# Patient Record
(demographics unavailable — no encounter records)

---

## 2020-04-11 NOTE — CON
[address]  [address]                    CONSULTATION                  
_______________________________________________________________________________
 
Name:       AMAIRANI BAIG                  Room:           [address] 
MXochitlRXochitl#:  [id number]      Account #:      [id number]  
Admission:  04/10/20     Attend Phys:    Floyd Kong MD 
Discharge:               Date of Birth:  01/19/43  
         Report #: [id number]
                                                                     [id number]  
_______________________________________________________________________________
THIS REPORT FOR:  //name//                      
 
cc:  Jeanne Winston Linda J. DO                                                
THIS REPORT FOR:  //name//                      
 
CC: Floyd Cormier
 
DATE OF SERVICE:  04/11/2020
 
 
CARDIOLOGY CONSULTATION
 
HISTORY OF PRESENT ILLNESS:  The patient is a 77-year-old  white male who
I was asked to see in the hospital after he complained of being short of breath.
 The patient has an extensive and complicated past medical history.  He has had
multiple stents in the past.  His first stent was placed in 2004 at [facility].  His last stent was placed in 2017 in [place], [place].  He has had a
total of 5 stents.  He has a history of an ischemic cardiomyopathy.  His first
defibrillator was implanted in 2018 at [facility].  He is
followed by Dr. Varela at that time.  Recently, he was admitted to [place] where
he spends the winter.  He underwent mitral valve clip for mitral regurgitation. 
He actually just saw my nurse practitioner, Lluvia Stevens 10 days ago.  He was
doing well until last night, he developed shortness of breath.  Denied any chest
pain, fever, cough, edema.  Denied noncompliance.  Denied any palpitations,
syncope.  He came to the hospital and was admitted.
 
PAST MEDICAL HISTORY:  He has had hernia repair, knee surgery, hypertension,
hyperlipidemia.
 
MEDICATIONS:  Consists of carvedilol, nebulized treatments, Plavix, Demadex as
needed, aspirin, Crestor, CPAP.
 
ALLERGIES:  He has no known drug allergies.
 
FAMILY HISTORY:  Negative for heart disease.
 
SOCIAL HISTORY:  He is .  He and his wife live in [place].  Retired
from [employer].  Smokes half pack of cigarettes.  No alcohol abuse.
 
REVIEW OF SYSTEMS:  He has had previous TIA.  No history of liver disease,
kidney disease, cancer, psychiatric illness, chronic skin condition, psychiatric
illness.
 
 
 
[place] [address]                    CONSULTATION                  
_______________________________________________________________________________
 
Name:       AMAIRANI BAIG                  Room:           [address] 
BREONNA#:  [id number]      Account #:      [id number]  
Admission:  04/10/20     Attend Phys:    Floyd Kong MD 
Discharge:               Date of Birth:  01/19/43  
         Report #: [id number]
                                                                     [id number]  
_______________________________________________________________________________
 
PHYSICAL EXAMINATION:
GENERAL:  Revealed an elderly male, lying in bed, appeared in no distress.
VITAL SIGNS:  Blood pressure is 110/60, pulse is 80.  He is afebrile.
HEENT:  He was anicteric.  Conjunctivae are pink.  Mucous membranes moist.
NECK:  Veins nondistended.  No carotid bruits.
CHEST:  Clear to auscultation.
CARDIOVASCULAR:  Regular rate and rhythm.  No significant murmurs.
ABDOMEN:  Soft.
EXTREMITIES:  Had no edema.  Posterior tibial pulse 2+ bilaterally.
SKIN:  Cool and dry.
NEUROLOGIC:  Nonfocal.
 
RADIOLOGICAL DATA:  His ECG showed P-wave sensing and ventricular capture,
occasional PVC.  His echocardiogram done in [place] in 02/2020 showed an ejection
fraction of 30%, severe mitral regurgitation, left atrial enlargement.
 
LABORATORY DATA:  His lab work last night showed cardiomegaly, mild pulmonary
congestion, defibrillator implant in place.  His lab work, sodium 142, BUN 16,
creatinine 1.6.  His liver function studies were normal.  BNP 1965.  His white
blood cell count 13.8, hemoglobin 10.8, hematocrit 35.6.
 
IMPRESSION AND RECOMMENDATIONS:
1.  Coronary artery disease.  No recent angina.  I would continue Plavix.
2.  Cardiomyopathy.  The patient is on a beta blocker.  I would recommend
starting Aldactone.  If tolerated, I would consider Entresto.  I would consider
spironolactone.
3.  Hyperlipidemia.  The patient is on a statin drug.
4.  Tobacco abuse.
5.  Chronic obstructive pulmonary disease.  The patient followed by Pulmonary.
6.  Previous implantation of defibrillator.
7.  Recent mitral valve clip for mitral regurgitation.
 
 
 
 
 
 
 
 
 
 
 
 
<ELECTRONICALLY SIGNED>
                                        By:  Tavo Tellez MD, FACC      
04/11/20     1105
D: 04/11/20 0921_______________________________________
T: 04/11/20 1009Dahaley Tellez MD, FACC         /nt

## 2020-04-11 NOTE — [place] [address]
Phone:  [phone]                     ELECTROCARDIOGRAM REPORT      
_______________________________________________________________________________
 
Name:         AMAIRANI BAIG                 Room:          [address]
M.R.#:    [id number]     Account #:     [id number]  
Admission:    04/10/20    Attend Phys:   Floyd Kong, 
Discharge:                Date of Birth: 43  
Date of Service: 04/10/20 2137  Report #:      [id number]
        [id number]
_______________________________________________________________________________
THIS REPORT FOR:  //name//                      
 
                         [facility] ED
                                       
Test Date:    2020-04-10               Test Time:    21:37:00
Pat Name:     AMAIRANI BAIG            Department:   
Patient ID:   [id number]            Room:         [facility]
Gender:       M                        Technician:   
:          1943               Requested By: Cristo Hartmann
Order Number: [id number] MD:   Tavo Tellez
                                 Measurements
Intervals                              Axis          
Rate:         114                      P:            25
WY:           48                       QRS:          10
QRSD:         129                      T:            167
QT:           321                                    
QTc:          443                                    
                           Interpretive Statements
sinus tachycardia with pvc's
LBBB
Baseline wander in lead(s) I,II,aVR,V1
Compared to ECG 2015 07:25:53
Sinus rhythm no longer present
pvc's noted
 
Electronically Signed On 2020 11:21:53 CDT by Tavo Tellez
[email or web address]
 
 
 
 
 
 
 
 
 
 
 
 
 
 
 
 
 
  <ELECTRONICALLY SIGNED>
                                           By: Tavo Tellez MD, FACC      
  20     1121
D: 04/10/20 2137   _____________________________________
T: 04/10/20 2137   Tavo Tellez MD, FAC        /EPI

## 2021-10-26 NOTE — EKG
[place] [address]
Phone:  [phone]                     ELECTROCARDIOGRAM REPORT      
_______________________________________________________________________________
 
Name:         AMAIRANI BAIG               Room:          [place] [address]    ADM [place] 
[facility].#:    [id number]     Account #:     [id number]  
Admission:    10/26/21    Attend Phys:   Barbara Root, 
Discharge:                Date of Birth: 43  
Date of Service: 10/25/21 2222  Report #:      [id number]
        [id number]
_______________________________________________________________________________
THIS REPORT FOR:  //name//                      
 
                         [facility] ED
                                       
Test Date:    2021-10-25               Test Time:    22:22:12
Pat Name:     AMAIRANI BAIG            Department:   
Patient ID:   [id number]            Room:         [facility]
Gender:       M                        Technician:   FL
:          1943               Requested By: Carina Espino
Order Number: [id number] MD:   Tavo Tellez
                                 Measurements
Intervals                              Axis          
Rate:         103                      P:            244
MI:           58                       QRS:          -34
QRSD:         119                      T:            116
QT:           326                                    
QTc:          427                                    
                           Interpretive Statements
atrial sensed and Ventricular-paced complexes with PVC's
No further analysis attempted due to paced rhythm
Compared to ECG 04/10/2020 21:37:00
no change
Electronically Signed On 10- 13:14:17 CDT by Tavo Tellez
[email or web address]
 
 
 
 
 
 
 
 
 
 
 
 
 
 
 
 
 
 
 
 
  <ELECTRONICALLY SIGNED>
                                           By: Tavo Tellez MD, FACC      
  10/26/21     1314
D: 10/25/21 2222   _____________________________________
T: 10/25/21 2222   Tavo Tellez MD, FACC        /EPI

## 2021-10-27 NOTE — CON
[address]  [address]                    CONSULTATION                  
_______________________________________________________________________________
 
Name:       AMAIRANI BIAG                Room:           [address] [place]  
M.R.#:  [id number]      Account #:      [id number]  
Admission:  10/26/21     Attend Phys:    Barbara Root MD 
Discharge:               Date of Birth:  01/19/43  
         Report #: [id number]
                                                                     [id number]
_______________________________________________________________________________
THIS REPORT FOR:  
 
cc:  Jeanne Winston Linda J. DO Pervez, Adeel MD                                                   ~
 
 
DATE OF CONSULTATION: 10/26/2021
 
REQUESTING PHYSICIAN:  Dr. Root.
 
INDICATION FOR CONSULTATION:  Lung mass and hilar lymphadenopathy.
 
HISTORY OF PRESENT ILLNESS:  This is a 78-year-old gentleman.  He has a history 
of congestive heart failure with a left ventricular ejection fraction decreased 
to around 35%.  He also has had mitral regurgitation in the past and is status 
post mitral clip.  He has a biventricular AICD in place.  He is an active smoker
and has been an active smoker for several decades; however, his previous lung 
function testing only show minimal abnormalities.  He is not on supplemental 
oxygen.  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  
This has been present at least since 2018 when he had a bronchoscopy performed 
in [facility]; the details from that time are not available to me at this time. 
He has been followed by the pulmonary group at [place] more recently. He is 
reported to have had a PET scan earlier this year and this has been followed 
with CAT scans since May with no major change.  I do not have the PET scan 
report available; however, according to the records from [place], the mass did 
not show any increase in activity on the PET scan.
 
The patient is now presented with shortness of breath of several days' duration.
 He has also had a cough.  There is not much sputum; in fact does not have much 
increase in swelling of lower extremities.  He does not have calf pain.  He does
not have upper respiratory complaints either.  He initially was hypoxemic.  Note
that he has not been on oxygen before and required 2-3 liters of oxygen to 
maintain O2 saturation in the low 90s.  The patient has now had a thoracentesis.
 He is also being diuresed.  Currently, he is on room air.
 
REVIEW OF SYSTEMS:  The patient's review of systems for a 12 points is negative 
except as mentioned above.
 
PAST MEDICAL HISTORY:  Congestive heart failure, systolic, chronic, status post 
AICD placement, left ventricular ejection fraction on the echo performed in 2018
was only 25%.  There is also significant mitral regurgitation in the past and 
status post mitral valve clip.  According to the Cardiology note, his ejection 
fraction is 35%.  It is not known to me as to whether there is a more recent 
measure of his left ventricular ejection fraction present. Cavitary lung mass, 
right upper lobe at least present since 2018 when [place] [address]                    CONSULTATION                  
_______________________________________________________________________________
 
Name:       SAFIAAMAIRANI FUNMI                Room:           [address] [place]  
[facility]#:  [id number]      Account #:      [id number]  
Admission:  10/26/21     Attend Phys:    Barbara Root MD 
Discharge:               Date of Birth:  01/19/43  
         Report #: [id number]
                                                                     [id number]
_______________________________________________________________________________
 
 
[facility]. According to the records from [place], it is stable at least since May. 
There is also mild lymphadenopathy.  PET scan earlier this year, according to 
the [place] records did not show any increase in activity in this region.  There
is minimal obstructive lung disease, on previous PFTs since 2015 as well as a 
spirometry was performed at [place] earlier this year, mild renal insufficiency.
 His baseline creatinine is 1.3.  History of left bundle branch block, atrial 
fibrillation.  He is not on anticoagulation, status post cardiac stents, 
hyperlipidemia.
 
SOCIAL HISTORY:  He is an active smoker and has now cut down to half pack a day,
has smoked more in the past, been smoker, more than 50 years.  No known history 
of heavy alcohol use or illegal drug use.
 
CURRENT MEDICATIONS:  List in [facility] reviewed.
 
HOME MEDICATIONS:  List also in [facility] reviewed.
 
IMMUNIZATION HISTORY:  Has had 3 doses of mRNA COVID-19 vaccine.
 
ALLERGIES:  No known drug allergies.
 
PHYSICAL EXAMINATION:
GENERAL:  He is alert, awake and oriented. Does not appear to be in any distress
at this time.
VITAL SIGNS:  Pulse of 72 and a blood pressure 112/62, he is saturating 97%.  He
is not on supplemental oxygen, his respiratory rate is 16.  He is afebrile with 
a temperature of 36.9.
HEENT:  Head is normocephalic and atraumatic.  Pupils are equal and reactive.  
There is no throat erythema.
NECK:  Does not show raised JVP, asymmetry, mass or lymph nodes.
CHEST:  Symmetrical expansion on inspection and palpation.  On auscultation, 
breath sounds are bilaterally equal.  I do not hear any added sounds.
HEART:  Regular.  There is no murmur.
ABDOMEN:  Soft and nontender.
EXTREMITIES:  Lower extremities show no edema, no calf tenderness.
SKIN:  Dry and intact.
NEUROLOGIC:  Moves all extremities bilaterally equally and spontaneously with no
focal deficit identified.
 
LABORATORY DATA:  The patient has had two chest x-ray was performed; the first 
one shows increase in pulmonary vascular congestion.  He also had a CTA chest 
performed, which does show pleural effusions, is not that clearly visible on the
initial chest x-ray, known mass and lymphadenopathy noted.  He had a subsequent 
chest x-ray performed today which shows resolution of previously seen increase 
 
 
 
[address] [place] [address]                    CONSULTATION                  
_______________________________________________________________________________
 
Name:       AMAIRANI BAIG                Room:           [address] [place]  
M.R.#:  [id number]      Account #:      [id number]  
Admission:  10/26/21     Attend Phys:    Barbara Root MD 
Discharge:               Date of Birth:  01/19/43  
         Report #: [id number]
                                                                     [id number]
_______________________________________________________________________________
 
 
in pulmonary vascular congestion.
 
ASSESSMENT AND PLAN:
1.  Shortness of breath.  The patient's chest x-ray findings as well as CT 
findings are consistent with left ventricular failure leading to acute pulmonary
edema and development of pleural effusions.  These findings appear to have 
already resolved significantly improved on the chest x-ray performed today. His 
underlying obstructive lung disease is minimal to mild and it does not appear to
me that bronchospasm is playing a major role here; also the infiltrate seen 
primarily due to pulmonary edema.
2.  Cavitary lung mass and lymphadenopathy.  See discussion above; findings are 
at least stable since May and have been present since 2018.  It is not known to 
me how these have progressed since 2018.  The patient desires further followup 
of this will be deferred to his regular pulmonologist at [place] and therefore, 
we will also defer to their outpatient followup.
3.  Acute pulmonary edema/left ventricular failure.  Interestingly, there does 
not appear to be much right heart failure, which we will go with history of 
mitral regurgitation.  Findings are already significantly better.  I would defer
followup to the primary and Cardiology services.  However, considering that 
there is an elevation in creatinine, may at this time, consider cutting back on 
diuresis.
4.  Minimal to mild obstructive lung disease.  I will cut back on his steroid.  
We will also start cutting back on his breathing treatments.
5.  Pulmonary infiltrates, primarily these are secondary to pulmonary edema; 
reasonable to continue broad spectrum antibiotic,
as doxycycline does have some
nephrotoxicity.  I decided to discontinue doxycycline and continue with 
ceftriaxone.  This could be switched over to oral antibiotics or discontinued
soon if he
continues to improve.
6. Pleural Effusions. Likely transudate 2nd to CHF, requested protein and LDH
to be added to analysis already send, will also follow cytology
6.  Suspected nocturnal hypoxemia/obstructive and central sleep apnea.  I 
strongly suspect that the patient would have desaturations while asleep.  I 
recommend that we at least do a Nocturnal pulse oximetry.  I offered to the 
patient to have the same performed while he is here to our hospital. The patient
prefers that we defer to outpatient followup at [place] and in my opinion as 
well it is not urgent to perform a nocturnal pulse oximetry.
7.  Mild chronic renal insufficiency, baseline creatinine 1.3.  If not already 
performed in the past a renal ultrasound could be considered.
8.  Deep venous thrombosis prophylaxis.  He is on Lovenox.  Also noted that he 
is on Plavix with the previous history of stents.
 
 
 
[place] [address]                    CONSULTATION                  
_______________________________________________________________________________
 
Name:       SAFIAASHLEYAMAIRANI FUNMI                Room:           [address] [place]  
..#:  [id number]      Account #:      [id number]  
Admission:  10/26/21     Attend Phys:    Barbara Root MD 
Discharge:               Date of Birth:  01/19/43  
         Report #: [id number]
                                                                     [id number]
_______________________________________________________________________________
 
 
 
Thanks for this consultation.
 
 
 
 
 
 
 
 
 
 
 
 
 
 
 
 
 
 
 
 
 
 
 
 
 
 
 
 
 
 
 
 
 
 
 
 
 
 
 
 
<ELECTRONICALLY SIGNED>
                                        By:  Brice Colindres MD              
10/27/21     0818
D: 10/26/21 2029_______________________________________
T: 10/26/21 2205Ajudy Colindres MD                 /nt